# Patient Record
Sex: FEMALE | Race: BLACK OR AFRICAN AMERICAN | URBAN - METROPOLITAN AREA
[De-identification: names, ages, dates, MRNs, and addresses within clinical notes are randomized per-mention and may not be internally consistent; named-entity substitution may affect disease eponyms.]

---

## 2023-02-22 ENCOUNTER — HOSPITAL ENCOUNTER (OUTPATIENT)
Dept: DATA CONVERSION | Facility: HOSPITAL | Age: 18
End: 2023-02-22
Attending: OBSTETRICS & GYNECOLOGY

## 2023-02-22 DIAGNOSIS — F12.90 CANNABIS USE, UNSPECIFIED, UNCOMPLICATED: ICD-10-CM

## 2023-02-22 DIAGNOSIS — Z3A.21 21 WEEKS GESTATION OF PREGNANCY (HHS-HCC): ICD-10-CM

## 2023-02-22 DIAGNOSIS — O99.322 DRUG USE COMPLICATING PREGNANCY, SECOND TRIMESTER (HHS-HCC): ICD-10-CM

## 2023-02-22 DIAGNOSIS — O26.892 OTHER SPECIFIED PREGNANCY RELATED CONDITIONS, SECOND TRIMESTER (HHS-HCC): ICD-10-CM

## 2023-02-22 DIAGNOSIS — R10.9 UNSPECIFIED ABDOMINAL PAIN: ICD-10-CM

## 2023-02-24 LAB — URINE CULTURE: ABNORMAL

## 2023-03-03 NOTE — PROGRESS NOTES
Current Stage:   Stage: Triage     OB Dating:   EDC/EGA:  ·  Final YRN 2023   ·  EGA 21.1     Subjective Data:   Antepartum:  Vaginal Bleeding: No   Contractions/Abdominal Pain: Yes   Discharge/Loss of Fluid: No   Fetal Movement: Good   Fevers/Chills: No   Preeclampsia Symptoms: No   Antepartum:    Prem is a 18 yo  at 21.1 d/b 17.1 wk US at [facility] who presents to triage with abdominal pain.     HPI: Patient states around 1030 am she started to have cramping 8/10 abdominal pain. She states it is not sharp, does not feel like ctx. She is haivng regular bowel movements, no diarrhea, did not eat anything abnl prior to pain. No urinary sx, no vaginal  irritation/odor/discharge. She states last intercourse was 2 days ago and no pain after. Endorses good FM, no leakage of fluid, no VB.      Pregnancy notable for:  - UDS + for marijuana and etoh at [facility] on     OBHx:   -   36wk for abruption; passed from SIDS  GynHx: no STIs, no pap indicated yet  PMH: as above  PSH: denies  Med: PNV, Fe  All: NKDA  Fam: reviewed and noncontributory  Soc: +etoh, +marijuana         Objective Information:    Objective Information:      T   P  R  BP   MAP  SpO2   Value  36.5  86  17  109/55   74  100%  Date/Time  15:52  15:52  15:52  15:52   15:52  15:52  Range  (36.5C - 36.5C )  (86 - 112 )  (16 - 17 )  (109 - 142 )/ (55 - 81 )  (74 - 74 )  (98% - 100% )      Pain reported at  18:00: 3 = Mild      Physical Exam:   Constitutional: alert, oriented   Obstetric: FHT: 155 on doppler  SVE: 1/10/-3 on initial and unchanged on 2hr recheck exam   Respiratory/Thorax: normal respiratory effort, on  room air   Gastrointestinal: soft, non-tender   Musculoskeletal: OWENS   Extremities: no erythema, edema, or tenderness to  palpation of b/l calves   Neurological: no deficits   Psychological: appropriate affect   Skin: no rashes or lesions     Assessment and Plan:   Assessment:    Prem is a  16 yo  at 21.1 d/b 17.1 wk US at [facility] who presents to triage with abdominal pain.     Abdominal Pain  - Abd pain starting at 1030 am, 8/10 pain, sharp, constant   - Cervix fingertip/10/-3, unchanged on 2 hr recheck, likely from multiparity  - Abdominal pain resolved s/p heat and tylenol  - urine culture sent, pending   - S/sx of labor reviewed  - Recommended tylenol, warm showers, hot packs for discomfort    IUP at 21.1  - NST reactive  - Good fetal movement  - Continue routine prenatal care, next appt: tomorrow at [facility]   - Precautions to return discussed    Maternal Well-being  - Vital signs stable and WNL  - Emotional support and reassurance provided  - All questions and concerns addressed    Staffed with Dr. Nunez.    JUANITA Castillo      Plan of Care Reviewed With:  Plan of Care Reviewed With: patient; significant  other     Attestation:   Note Completion:  I am a:  Advanced Practice Provider   Attending Only - Shared Visit with Advanced Practice Provider This is a shared visit.  I have reviewed the Advanced Practice Provider?s encounter note, approve the Advanced Practice Provider?s documentation,  and provide the following additional information from my personal encounter.    Comments/ Additional Findings    Patient presented at 21 weeks gestation for abdominal cramping. Her cervix was felt to be about fingertip dilated which likely just represents her  multiparous cervix. It was firm and thick on exam. No contractions on toco and abdominal cramping resolved. Patient stable for discharge home with outpatient follow up at [facility] with her primary OB/GYN. She has an appointment tomorrow and she was  told to keep this appointment.           Electronic Signatures:  Carin Ng (PAC)  (Signed 2023 21:38)   Authored: Current Stage, OB Dating, Subjective Data,  Objective Data, Assessment and Plan, Note Completion  Itzel Nunez)  (Signed 2023  23:20)   Authored: Assessment and Plan, Note Completion   Co-Signer: Current Stage, OB Dating, Subjective Data, Objective Data, Assessment and Plan, Note Completion      Last Updated: 22-Feb-2023 23:20 by Itzel Nunez)

## 2023-09-06 VITALS
OXYGEN SATURATION: 98 % | HEART RATE: 112 BPM | HEIGHT: 61 IN | SYSTOLIC BLOOD PRESSURE: 142 MMHG | WEIGHT: 114.42 LBS | RESPIRATION RATE: 16 BRPM | BODY MASS INDEX: 21.6 KG/M2 | DIASTOLIC BLOOD PRESSURE: 81 MMHG

## 2023-09-14 NOTE — PROGRESS NOTES
Current Stage:   Stage: Triage     OB Dating:   EDC/EGA:  ·  Final YRN 2023   ·  EGA 21.1     Subjective Data:   Antepartum:  Vaginal Bleeding: No   Contractions/Abdominal Pain: Yes   Discharge/Loss of Fluid: No   Fetal Movement: Good   Fevers/Chills: No   Preeclampsia Symptoms: No   Antepartum:    Prem is a 18 yo  at 21.1 d/b 17.1 wk US at [facility] [place] who presents to triage with abdominal pain.     HPI: Patient states around 1030 am she started to have cramping 8/10 abdominal pain. She states it is not sharp, does not feel like ctx. She is haivng regular bowel movements, no diarrhea, did not eat anything abnl prior to pain. No urinary sx, no vaginal  irritation/odor/discharge. She states last intercourse was 2 days ago and no pain after. Endorses good FM, no leakage of fluid, no VB.      Pregnancy notable for:  - UDS + for marijuana and etoh at [facility] on     OBHx:   -   36wk for abruption; passed from SIDS  GynHx: no STIs, no pap indicated yet  PMH: as above  PSH: denies  Med: PNV, Fe  All: NKDA  Fam: reviewed and noncontributory  Soc: +etoh, +marijuana         Objective Information:    Objective Information:      T   P  R  BP   MAP  SpO2   Value  36.5  86  17  109/55   74  100%  Date/Time  15:52  15:52  15:52  15:52   15:52  15:52  Range  (36.5C - 36.5C )  (86 - 112 )  (16 - 17 )  (109 - 142 )/ (55 - 81 )  (74 - 74 )  (98% - 100% )      Pain reported at  18:00: 3 = Mild      Physical Exam:   Constitutional: alert, oriented   Obstetric: FHT: 155 on doppler  SVE: 1/10/-3 on initial and unchanged on 2hr recheck exam   Respiratory/Thorax: normal respiratory effort, on  room air   Gastrointestinal: soft, non-tender   Musculoskeletal: OWENS   Extremities: no erythema, edema, or tenderness to  palpation of b/l calves   Neurological: no deficits   Psychological: appropriate affect   Skin: no rashes or lesions     Assessment and Plan:   Assessment:    Prem is a  18 yo  at 21.1 d/b 17.1 wk US at [facility] [place] who presents to triage with abdominal pain.     Abdominal Pain  - Abd pain starting at 1030 am, 8/10 pain, sharp, constant   - Cervix fingertip/10/-3, unchanged on 2 hr recheck, likely from multiparity  - Abdominal pain resolved s/p heat and tylenol  - urine culture sent, pending   - S/sx of labor reviewed  - Recommended tylenol, warm showers, hot packs for discomfort    IUP at 21.1  - NST reactive  - Good fetal movement  - Continue routine prenatal care, next appt: tomorrow at [facility]   - Precautions to return discussed    Maternal Well-being  - Vital signs stable and WNL  - Emotional support and reassurance provided  - All questions and concerns addressed    Staffed with Dr. Nunez.    JUANITA Castillo      Plan of Care Reviewed With:  Plan of Care Reviewed With: patient; significant  other     Attestation:   Note Completion:  I am a:  Advanced Practice Provider   Attending Only - Shared Visit with Advanced Practice Provider This is a shared visit.  I have reviewed the Advanced Practice Provider?s encounter note, approve the Advanced Practice Provider?s documentation,  and provide the following additional information from my personal encounter.    Comments/ Additional Findings    Patient presented at 21 weeks gestation for abdominal cramping. Her cervix was felt to be about fingertip dilated which likely just represents her  multiparous cervix. It was firm and thick on exam. No contractions on toco and abdominal cramping resolved. Patient stable for discharge home with outpatient follow up at [facility] with her primary OB/GYN. She has an appointment tomorrow and she was  told to keep this appointment.           Electronic Signatures:  Carin Ng (PAC)  (Signed 2023 21:38)   Authored: Current Stage, OB Dating, Subjective Data,  Objective Data, Assessment and Plan, Note Completion  Itzel Nunez)  (Signed 2023  23:20)   Authored: Assessment and Plan, Note Completion   Co-Signer: Current Stage, OB Dating, Subjective Data, Objective Data, Assessment and Plan, Note Completion      Last Updated: 22-Feb-2023 23:20 by Itzel Nunez)